# Patient Record
Sex: FEMALE | Race: BLACK OR AFRICAN AMERICAN | Employment: UNEMPLOYED | URBAN - METROPOLITAN AREA
[De-identification: names, ages, dates, MRNs, and addresses within clinical notes are randomized per-mention and may not be internally consistent; named-entity substitution may affect disease eponyms.]

---
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
## 2022-01-01 LAB — GBS, EXTERNAL RESULT: NEGATIVE

## 2022-01-17 ENCOUNTER — ANESTHESIA (OUTPATIENT)
Dept: LABOR AND DELIVERY | Age: 22
DRG: 560 | End: 2022-01-17
Payer: COMMERCIAL

## 2022-01-17 ENCOUNTER — HOSPITAL ENCOUNTER (INPATIENT)
Age: 22
LOS: 2 days | Discharge: HOME | DRG: 560 | End: 2022-01-19
Attending: OBSTETRICS & GYNECOLOGY | Admitting: OBSTETRICS & GYNECOLOGY
Payer: COMMERCIAL

## 2022-01-17 ENCOUNTER — ANESTHESIA EVENT (OUTPATIENT)
Dept: LABOR AND DELIVERY | Age: 22
DRG: 560 | End: 2022-01-17
Payer: COMMERCIAL

## 2022-01-17 PROBLEM — Z34.90 ENCOUNTER FOR INDUCTION OF LABOR: Status: ACTIVE | Noted: 2022-01-17

## 2022-01-17 LAB
ABO/RH: NORMAL
ALBUMIN SERPL-MCNC: 3 G/DL (ref 3.4–5)
ALP BLD-CCNC: 180 U/L (ref 40–129)
ALT SERPL-CCNC: 10 U/L (ref 10–40)
AMPHETAMINE SCREEN, URINE: ABNORMAL
ANTIBODY SCREEN: NORMAL
AST SERPL-CCNC: 18 U/L (ref 15–37)
BARBITURATE SCREEN URINE: ABNORMAL
BENZODIAZEPINE SCREEN, URINE: ABNORMAL
BILIRUB SERPL-MCNC: 0.3 MG/DL (ref 0–1)
BILIRUBIN DIRECT: <0.2 MG/DL (ref 0–0.3)
BILIRUBIN, INDIRECT: ABNORMAL MG/DL (ref 0–1)
BUN BLDV-MCNC: 3 MG/DL (ref 7–20)
BUPRENORPHINE URINE: ABNORMAL
CANNABINOID SCREEN URINE: POSITIVE
COCAINE METABOLITE SCREEN URINE: ABNORMAL
CREAT SERPL-MCNC: <0.5 MG/DL (ref 0.6–1.1)
CREATININE URINE: 147.3 MG/DL (ref 28–259)
GFR AFRICAN AMERICAN: >60
GFR NON-AFRICAN AMERICAN: >60
HCT VFR BLD CALC: 30 % (ref 36–48)
HEMOGLOBIN: 9.8 G/DL (ref 12–16)
Lab: ABNORMAL
MCH RBC QN AUTO: 26.5 PG (ref 26–34)
MCHC RBC AUTO-ENTMCNC: 32.7 G/DL (ref 31–36)
MCV RBC AUTO: 80.9 FL (ref 80–100)
METHADONE SCREEN, URINE: ABNORMAL
OPIATE SCREEN URINE: ABNORMAL
OXYCODONE URINE: ABNORMAL
PDW BLD-RTO: 17 % (ref 12.4–15.4)
PH UA: 7
PHENCYCLIDINE SCREEN URINE: ABNORMAL
PLATELET # BLD: 264 K/UL (ref 135–450)
PMV BLD AUTO: 8.4 FL (ref 5–10.5)
PROPOXYPHENE SCREEN: ABNORMAL
PROTEIN PROTEIN: 17 MG/DL
PROTEIN/CREAT RATIO: 0.1 MG/DL
RBC # BLD: 3.71 M/UL (ref 4–5.2)
RPR: NORMAL
SARS-COV-2, NAAT: NOT DETECTED
TOTAL PROTEIN: 6.3 G/DL (ref 6.4–8.2)
URIC ACID, SERUM: 5.9 MG/DL (ref 2.6–6)
WBC # BLD: 13.8 K/UL (ref 4–11)

## 2022-01-17 PROCEDURE — 86901 BLOOD TYPING SEROLOGIC RH(D): CPT

## 2022-01-17 PROCEDURE — 84550 ASSAY OF BLOOD/URIC ACID: CPT

## 2022-01-17 PROCEDURE — 2500000003 HC RX 250 WO HCPCS: Performed by: NURSE ANESTHETIST, CERTIFIED REGISTERED

## 2022-01-17 PROCEDURE — 82570 ASSAY OF URINE CREATININE: CPT

## 2022-01-17 PROCEDURE — 6360000002 HC RX W HCPCS: Performed by: OBSTETRICS & GYNECOLOGY

## 2022-01-17 PROCEDURE — 1220000000 HC SEMI PRIVATE OB R&B

## 2022-01-17 PROCEDURE — 84520 ASSAY OF UREA NITROGEN: CPT

## 2022-01-17 PROCEDURE — 84156 ASSAY OF PROTEIN URINE: CPT

## 2022-01-17 PROCEDURE — 51701 INSERT BLADDER CATHETER: CPT

## 2022-01-17 PROCEDURE — 82565 ASSAY OF CREATININE: CPT

## 2022-01-17 PROCEDURE — 86900 BLOOD TYPING SEROLOGIC ABO: CPT

## 2022-01-17 PROCEDURE — 2580000003 HC RX 258: Performed by: NURSE ANESTHETIST, CERTIFIED REGISTERED

## 2022-01-17 PROCEDURE — 6370000000 HC RX 637 (ALT 250 FOR IP): Performed by: OBSTETRICS & GYNECOLOGY

## 2022-01-17 PROCEDURE — 3700000025 EPIDURAL BLOCK: Performed by: ANESTHESIOLOGY

## 2022-01-17 PROCEDURE — 80076 HEPATIC FUNCTION PANEL: CPT

## 2022-01-17 PROCEDURE — 59025 FETAL NON-STRESS TEST: CPT

## 2022-01-17 PROCEDURE — 86850 RBC ANTIBODY SCREEN: CPT

## 2022-01-17 PROCEDURE — 86592 SYPHILIS TEST NON-TREP QUAL: CPT

## 2022-01-17 PROCEDURE — 2580000003 HC RX 258: Performed by: OBSTETRICS & GYNECOLOGY

## 2022-01-17 PROCEDURE — 85027 COMPLETE CBC AUTOMATED: CPT

## 2022-01-17 PROCEDURE — 96374 THER/PROPH/DIAG INJ IV PUSH: CPT

## 2022-01-17 PROCEDURE — 2500000003 HC RX 250 WO HCPCS

## 2022-01-17 PROCEDURE — 87635 SARS-COV-2 COVID-19 AMP PRB: CPT

## 2022-01-17 PROCEDURE — 80307 DRUG TEST PRSMV CHEM ANLYZR: CPT

## 2022-01-17 RX ORDER — ACETAMINOPHEN 500 MG
1000 TABLET ORAL ONCE
Status: COMPLETED | OUTPATIENT
Start: 2022-01-17 | End: 2022-01-17

## 2022-01-17 RX ORDER — NALBUPHINE HCL 10 MG/ML
5 AMPUL (ML) INJECTION EVERY 4 HOURS PRN
Status: DISCONTINUED | OUTPATIENT
Start: 2022-01-17 | End: 2022-01-17 | Stop reason: ALTCHOICE

## 2022-01-17 RX ORDER — NALBUPHINE HCL 10 MG/ML
5 AMPUL (ML) INJECTION
Status: COMPLETED | OUTPATIENT
Start: 2022-01-17 | End: 2022-01-17

## 2022-01-17 RX ORDER — CARBOPROST TROMETHAMINE 250 UG/ML
250 INJECTION, SOLUTION INTRAMUSCULAR PRN
Status: DISCONTINUED | OUTPATIENT
Start: 2022-01-17 | End: 2022-01-17

## 2022-01-17 RX ORDER — HYDROCODONE BITARTRATE AND ACETAMINOPHEN 5; 325 MG/1; MG/1
2 TABLET ORAL EVERY 4 HOURS PRN
Status: DISCONTINUED | OUTPATIENT
Start: 2022-01-17 | End: 2022-01-19 | Stop reason: HOSPADM

## 2022-01-17 RX ORDER — METHYLERGONOVINE MALEATE 0.2 MG/ML
200 INJECTION INTRAVENOUS PRN
Status: DISCONTINUED | OUTPATIENT
Start: 2022-01-17 | End: 2022-01-17

## 2022-01-17 RX ORDER — SODIUM CHLORIDE, SODIUM LACTATE, POTASSIUM CHLORIDE, CALCIUM CHLORIDE 600; 310; 30; 20 MG/100ML; MG/100ML; MG/100ML; MG/100ML
INJECTION, SOLUTION INTRAVENOUS CONTINUOUS
Status: DISCONTINUED | OUTPATIENT
Start: 2022-01-17 | End: 2022-01-17

## 2022-01-17 RX ORDER — SODIUM CHLORIDE 9 MG/ML
25 INJECTION, SOLUTION INTRAVENOUS PRN
Status: DISCONTINUED | OUTPATIENT
Start: 2022-01-17 | End: 2022-01-17

## 2022-01-17 RX ORDER — IBUPROFEN 800 MG/1
800 TABLET ORAL EVERY 8 HOURS PRN
Status: DISCONTINUED | OUTPATIENT
Start: 2022-01-17 | End: 2022-01-19 | Stop reason: HOSPADM

## 2022-01-17 RX ORDER — NALOXONE HYDROCHLORIDE 0.4 MG/ML
0.4 INJECTION, SOLUTION INTRAMUSCULAR; INTRAVENOUS; SUBCUTANEOUS PRN
Status: DISCONTINUED | OUTPATIENT
Start: 2022-01-17 | End: 2022-01-17 | Stop reason: ALTCHOICE

## 2022-01-17 RX ORDER — DOCUSATE SODIUM 100 MG/1
100 CAPSULE, LIQUID FILLED ORAL 2 TIMES DAILY
Status: DISCONTINUED | OUTPATIENT
Start: 2022-01-17 | End: 2022-01-19 | Stop reason: HOSPADM

## 2022-01-17 RX ORDER — SODIUM CHLORIDE 0.9 % (FLUSH) 0.9 %
5-40 SYRINGE (ML) INJECTION EVERY 12 HOURS SCHEDULED
Status: DISCONTINUED | OUTPATIENT
Start: 2022-01-17 | End: 2022-01-17

## 2022-01-17 RX ORDER — SEVOFLURANE 250 ML/250ML
1 LIQUID RESPIRATORY (INHALATION) CONTINUOUS PRN
Status: DISCONTINUED | OUTPATIENT
Start: 2022-01-17 | End: 2022-01-17

## 2022-01-17 RX ORDER — SODIUM CHLORIDE 9 MG/ML
25 INJECTION, SOLUTION INTRAVENOUS PRN
Status: DISCONTINUED | OUTPATIENT
Start: 2022-01-17 | End: 2022-01-19 | Stop reason: HOSPADM

## 2022-01-17 RX ORDER — SODIUM CHLORIDE, SODIUM LACTATE, POTASSIUM CHLORIDE, AND CALCIUM CHLORIDE .6; .31; .03; .02 G/100ML; G/100ML; G/100ML; G/100ML
1000 INJECTION, SOLUTION INTRAVENOUS PRN
Status: DISCONTINUED | OUTPATIENT
Start: 2022-01-17 | End: 2022-01-17

## 2022-01-17 RX ORDER — ACETAMINOPHEN 325 MG/1
650 TABLET ORAL EVERY 4 HOURS PRN
Status: DISCONTINUED | OUTPATIENT
Start: 2022-01-17 | End: 2022-01-17

## 2022-01-17 RX ORDER — ACETAMINOPHEN 325 MG/1
650 TABLET ORAL EVERY 4 HOURS PRN
Status: DISCONTINUED | OUTPATIENT
Start: 2022-01-17 | End: 2022-01-19 | Stop reason: HOSPADM

## 2022-01-17 RX ORDER — ONDANSETRON 2 MG/ML
4 INJECTION INTRAMUSCULAR; INTRAVENOUS EVERY 6 HOURS PRN
Status: DISCONTINUED | OUTPATIENT
Start: 2022-01-17 | End: 2022-01-17

## 2022-01-17 RX ORDER — SODIUM CHLORIDE, SODIUM LACTATE, POTASSIUM CHLORIDE, CALCIUM CHLORIDE 600; 310; 30; 20 MG/100ML; MG/100ML; MG/100ML; MG/100ML
INJECTION, SOLUTION INTRAVENOUS CONTINUOUS PRN
Status: DISCONTINUED | OUTPATIENT
Start: 2022-01-17 | End: 2022-01-17 | Stop reason: SDUPTHER

## 2022-01-17 RX ORDER — SODIUM CHLORIDE 0.9 % (FLUSH) 0.9 %
5-40 SYRINGE (ML) INJECTION PRN
Status: DISCONTINUED | OUTPATIENT
Start: 2022-01-17 | End: 2022-01-17

## 2022-01-17 RX ORDER — NICOTINE 21 MG/24HR
1 PATCH, TRANSDERMAL 24 HOURS TRANSDERMAL DAILY
Status: DISCONTINUED | OUTPATIENT
Start: 2022-01-18 | End: 2022-01-19 | Stop reason: HOSPADM

## 2022-01-17 RX ORDER — ONDANSETRON 2 MG/ML
4 INJECTION INTRAMUSCULAR; INTRAVENOUS EVERY 6 HOURS PRN
Status: DISCONTINUED | OUTPATIENT
Start: 2022-01-17 | End: 2022-01-17 | Stop reason: ALTCHOICE

## 2022-01-17 RX ORDER — ONDANSETRON 4 MG/1
4 TABLET, FILM COATED ORAL ONCE
Status: COMPLETED | OUTPATIENT
Start: 2022-01-17 | End: 2022-01-17

## 2022-01-17 RX ORDER — HYDROCODONE BITARTRATE AND ACETAMINOPHEN 5; 325 MG/1; MG/1
1 TABLET ORAL EVERY 4 HOURS PRN
Status: DISCONTINUED | OUTPATIENT
Start: 2022-01-17 | End: 2022-01-19 | Stop reason: HOSPADM

## 2022-01-17 RX ORDER — LANOLIN 100 %
OINTMENT (GRAM) TOPICAL PRN
Status: DISCONTINUED | OUTPATIENT
Start: 2022-01-17 | End: 2022-01-19 | Stop reason: HOSPADM

## 2022-01-17 RX ORDER — MISOPROSTOL 200 UG/1
800 TABLET ORAL PRN
Status: DISCONTINUED | OUTPATIENT
Start: 2022-01-17 | End: 2022-01-17

## 2022-01-17 RX ORDER — LIDOCAINE HYDROCHLORIDE 10 MG/ML
30 INJECTION, SOLUTION EPIDURAL; INFILTRATION; INTRACAUDAL; PERINEURAL PRN
Status: DISCONTINUED | OUTPATIENT
Start: 2022-01-17 | End: 2022-01-17

## 2022-01-17 RX ORDER — SODIUM CHLORIDE 0.9 % (FLUSH) 0.9 %
10 SYRINGE (ML) INJECTION EVERY 12 HOURS SCHEDULED
Status: DISCONTINUED | OUTPATIENT
Start: 2022-01-17 | End: 2022-01-19 | Stop reason: HOSPADM

## 2022-01-17 RX ORDER — DIPHENHYDRAMINE HYDROCHLORIDE 50 MG/ML
25 INJECTION INTRAMUSCULAR; INTRAVENOUS EVERY 6 HOURS PRN
Status: DISCONTINUED | OUTPATIENT
Start: 2022-01-17 | End: 2022-01-17 | Stop reason: ALTCHOICE

## 2022-01-17 RX ORDER — SODIUM CHLORIDE 0.9 % (FLUSH) 0.9 %
10 SYRINGE (ML) INJECTION PRN
Status: DISCONTINUED | OUTPATIENT
Start: 2022-01-17 | End: 2022-01-19 | Stop reason: HOSPADM

## 2022-01-17 RX ORDER — SODIUM CHLORIDE, SODIUM LACTATE, POTASSIUM CHLORIDE, AND CALCIUM CHLORIDE .6; .31; .03; .02 G/100ML; G/100ML; G/100ML; G/100ML
500 INJECTION, SOLUTION INTRAVENOUS PRN
Status: DISCONTINUED | OUTPATIENT
Start: 2022-01-17 | End: 2022-01-17

## 2022-01-17 RX ORDER — TERBUTALINE SULFATE 1 MG/ML
0.25 INJECTION, SOLUTION SUBCUTANEOUS ONCE
Status: DISCONTINUED | OUTPATIENT
Start: 2022-01-17 | End: 2022-01-17

## 2022-01-17 RX ADMIN — SODIUM CHLORIDE, POTASSIUM CHLORIDE, SODIUM LACTATE AND CALCIUM CHLORIDE: 600; 310; 30; 20 INJECTION, SOLUTION INTRAVENOUS at 17:42

## 2022-01-17 RX ADMIN — IBUPROFEN 800 MG: 800 TABLET, FILM COATED ORAL at 23:38

## 2022-01-17 RX ADMIN — SODIUM CHLORIDE, POTASSIUM CHLORIDE, SODIUM LACTATE AND CALCIUM CHLORIDE: 600; 310; 30; 20 INJECTION, SOLUTION INTRAVENOUS at 13:47

## 2022-01-17 RX ADMIN — Medication 14 ML: at 16:38

## 2022-01-17 RX ADMIN — ACETAMINOPHEN 1000 MG: 500 TABLET ORAL at 11:01

## 2022-01-17 RX ADMIN — SODIUM CHLORIDE, SODIUM LACTATE, POTASSIUM CHLORIDE, AND CALCIUM CHLORIDE: .6; .31; .03; .02 INJECTION, SOLUTION INTRAVENOUS at 16:38

## 2022-01-17 RX ADMIN — ONDANSETRON HYDROCHLORIDE 4 MG: 4 TABLET, FILM COATED ORAL at 11:01

## 2022-01-17 RX ADMIN — DOCUSATE SODIUM 100 MG: 100 CAPSULE ORAL at 23:38

## 2022-01-17 RX ADMIN — NALBUPHINE HYDROCHLORIDE 5 MG: 10 INJECTION, SOLUTION INTRAMUSCULAR; INTRAVENOUS; SUBCUTANEOUS at 13:58

## 2022-01-17 RX ADMIN — NALBUPHINE HYDROCHLORIDE 5 MG: 10 INJECTION, SOLUTION INTRAMUSCULAR; INTRAVENOUS; SUBCUTANEOUS at 13:59

## 2022-01-17 RX ADMIN — Medication 87.3 MILLI-UNITS/MIN: at 21:37

## 2022-01-17 ASSESSMENT — PAIN DESCRIPTION - DESCRIPTORS
DESCRIPTORS: CRAMPING

## 2022-01-17 ASSESSMENT — PAIN SCALES - GENERAL
PAINLEVEL_OUTOF10: 1
PAINLEVEL_OUTOF10: 10
PAINLEVEL_OUTOF10: 10

## 2022-01-17 NOTE — FLOWSHEET NOTE
Pt c/o nausea/vomitting from reflux (pt states). Called Dr. Mendez Andrade and rec'd new orders for Zofran and Tylenol. Medications administered.

## 2022-01-17 NOTE — FLOWSHEET NOTE
SVE:2-3/80/-2 possible rupture of membranes. RN did not place cytotec at this time. Dr. Mendez Andrade aware and will come see pt.

## 2022-01-17 NOTE — ANESTHESIA PRE PROCEDURE
Department of Anesthesiology  Preprocedure Note       Name:  Germaine Schultz   Age:  25 y.o.  :  2000                                          MRN:  [id number]         Date:  2022      Surgeon: Dr. Jonatan Betts  Procedure: Yossi Estimable for L&D  Medications prior to admission:   Prior to Admission medications    Medication Sig Start Date End Date Taking?  Authorizing Provider   ARIPiprazole (ABILIFY) 10 MG tablet Take by mouth daily   Yes Historical Provider, MD   Prenatal 6.75-0.2 MG TABS Take 1 tablet by mouth daily 21  Yes Travis Calderon DO       Current medications:    Current Facility-Administered Medications   Medication Dose Route Frequency Provider Last Rate Last Admin    miSOPROStol (CYTOTEC) pre-split tablet TABS 25 mcg  25 mcg Vaginal Q4H PRN Afshan Gonzales MD        oxytocin (PITOCIN) 30 units in 500 mL infusion  1-20 maggie-units/min IntraVENous Continuous Afshan Gonzales MD        terbutaline (BRETHINE) injection 0.25 mg  0.25 mg SubCUTAneous Once Afshan Gonzales MD        lactated ringers infusion   IntraVENous Continuous Afshan Gonzales  mL/hr at 22 1347 New Bag at 22 1347    lactated ringers bolus  500 mL IntraVENous PRN Afshan Gonzales MD        Or    lactated ringers bolus  1,000 mL IntraVENous PRN Afshan Gonzales MD        sodium chloride flush 0.9 % injection 5-40 mL  5-40 mL IntraVENous 2 times per day Afshan Gonzales MD        sodium chloride flush 0.9 % injection 5-40 mL  5-40 mL IntraVENous PRN Afshan Gonzales MD        0.9 % sodium chloride infusion  25 mL IntraVENous PRN Afshan Gonzales MD        lidocaine PF 1 % injection 30 mL  30 mL Other PRN Afshan Gonzales MD        nitrous oxide 50% inhalation 1 each  1 each Inhalation Continuous PRN Afshan Gonzales MD        [facility] injection 4 mg  4 mg IntraVENous Q6H PRN Afshan Gonzales MD        oxytocin (PITOCIN) 30 units in 500 mL infusion  87.3 maggie-units/min IntraVENous PRN Luke Mccartney MD        And    oxytocin (PITOCIN) 10 unit bolus from the bag  10 Units IntraVENous PRN Luke Mccartney MD        methylergonovine (METHERGINE) injection 200 mcg  200 mcg IntraMUSCular PRN Luke Mccartney MD        carboprost (HEMABATE) injection 250 mcg  250 mcg IntraMUSCular PRN Luke Mccartney MD        miSOPROStol (CYTOTEC) tablet 800 mcg  800 mcg Rectal PRN Luke Mccartney MD        acetaminophen (TYLENOL) tablet 650 mg  650 mg Oral Q4H PRN Luke Mccartney MD        benzocaine-menthol (DERMOPLAST) 20-0.5 % spray   Topical PRN Luke Mccartney MD           Allergies:  No Known Allergies    Problem List:    Patient Active Problem List   Diagnosis Code    Bipolar 1 disorder ([place] [address].) F31.9    Class 3 severe obesity due to excess calories with serious comorbidity and body mass index (BMI) of 45.0 to 49.9 in adult ([facility] E66.01, Z68.42    Encounter for induction of labor Z34.90       Past Medical History:        Diagnosis Date    Bipolar 1 disorder ([facility]     Depression        Past Surgical History:  History reviewed. No pertinent surgical history.     Social History:    Social History     Tobacco Use    Smoking status: Never Smoker    Smokeless tobacco: Never Used   Substance Use Topics    Alcohol use: Never                                Counseling given: Not Answered      Vital Signs (Current):   Vitals:    01/17/22 1214 01/17/22 1409 01/17/22 1431 01/17/22 1433   BP: (!) 139/111 (!) 168/103 (!) 185/105 (!) 185/105   Pulse: 96 81 78 78   Resp:       Temp:       TempSrc:       SpO2:       Weight:       Height:                                                  BP Readings from Last 3 Encounters:   01/17/22 (!) 185/105   09/23/21 (!) 131/93   09/18/21 113/65       NPO Status:                                                                                 BMI:   Wt Readings from Last 3 Encounters:   01/17/22 (!) 299 lb (135.6 kg) (>99 %, Z= 2.78)*   09/23/21 (!) 258 lb (117 kg) (>99 %, Z= 2.52)*   09/18/21 (!) 258 lb (117 kg) (>99 %, Z= 2.52)*     * Growth percentiles are based on CDC (Girls, 2-20 Years) data. Body mass index is 45.46 kg/m². CBC:   Lab Results   Component Value Date    WBC 13.8 01/17/2022    RBC 3.71 01/17/2022    HGB 9.8 01/17/2022    HCT 30.0 01/17/2022    MCV 80.9 01/17/2022    RDW 17.0 01/17/2022     01/17/2022       CMP:   Lab Results   Component Value Date     09/23/2021    K 3.0 09/23/2021     09/23/2021    CO2 18 09/23/2021    BUN 3 01/17/2022    CREATININE <0.5 01/17/2022    GFRAA >60 01/17/2022    AGRATIO 1.1 09/23/2021    LABGLOM >60 01/17/2022    GLUCOSE 107 09/23/2021    PROT 6.3 01/17/2022    CALCIUM 8.8 09/23/2021    BILITOT 0.3 01/17/2022    ALKPHOS 180 01/17/2022    AST 18 01/17/2022    ALT 10 01/17/2022       POC Tests: No results for input(s): POCGLU, POCNA, POCK, POCCL, POCBUN, POCHEMO, POCHCT in the last 72 hours. Coags: No results found for: PROTIME, INR, APTT    HCG (If Applicable): No results found for: PREGTESTUR, PREGSERUM, HCG, HCGQUANT     ABGs: No results found for: PHART, PO2ART, KYP9AQH, MWA4MZX, BEART, K4HPQBFS     Type & Screen (If Applicable):  No results found for: LABABO, LABRH    Drug/Infectious Status (If Applicable):  No results found for: HIV, HEPCAB    COVID-19 Screening (If Applicable):   Lab Results   Component Value Date    COVID19 Not Detected 01/17/2022           Anesthesia Evaluation  Patient summary reviewed and Nursing notes reviewed  Airway: Mallampati: II  TM distance: >3 FB   Neck ROM: full  Mouth opening: > = 3 FB Dental: normal exam         Pulmonary:Negative Pulmonary ROS and normal exam              Patient did not smoke on day of surgery.                  Cardiovascular:Negative CV ROS             Beta Blocker:  Not on Beta Blocker         Neuro/Psych:   Negative Neuro/Psych ROS              GI/Hepatic/Renal: Neg GI/Hepatic/Renal ROS            Endo/Other: Negative Endo/Other ROS Pt had no PAT visit       Abdominal:             Vascular: negative vascular ROS. Other Findings:             Anesthesia Plan      epidural     ASA 2             Anesthetic plan and risks discussed with spouse and patient. Use of blood products discussed with patient and spouse whom consented to blood products. Plan discussed with CRNA. OB History        1    Para        Term                AB        Living           SAB        IAB        Ectopic        Molar        Multiple        Live Births                    Ernesto Cai is a 25y.o. year-old female admitted to Alejandro Jimenes MD for INGRID. Gestational age is 38w3d. Her Body mass index is 45.46 kg/m². She was seen, examined and her chart was reviewed (including anesthesia, drug and allergy history). No interval changes are noted to her history and physical examination. (except as noted above).     Risks/benefits/alternatives of both neuraxial and general anesthesia were discussed and she agrees to proceed at the direction of the care team.    KATE Heard - KEZIA  2022  3:18 PM    KATE Heard - KEZIA   2022

## 2022-01-17 NOTE — ANESTHESIA PROCEDURE NOTES
Epidural Block    Patient location during procedure: OB  Start time: 1/17/2022 4:38 PM  End time: 1/17/2022 4:52 PM  Reason for block: labor epidural  Preanesthetic Checklist  Completed: patient identified, IV checked, site marked, risks and benefits discussed, surgical consent, monitors and equipment checked, pre-op evaluation, timeout performed, anesthesia consent given, oxygen available and patient being monitored  Epidural  Patient position: sitting  Prep: Betadine and site prepped and draped  Patient monitoring: continuous pulse ox and frequent blood pressure checks  Approach: midline  Location: lumbar (1-5)  Injection technique: FOREST saline  Guidance: paresthesia technique  Provider prep: mask and sterile gloves  Needle  Needle type: Tuohy   Needle gauge: 17 G  Needle length: 3.5 in  Needle insertion depth: 9 cm  Catheter type: side hole  Catheter size: 20G. Catheter at skin depth: 16 cm  Test dose: negative  Assessment  Sensory level: T6  Hemodynamics: stable  Attempts: 1  Additional Notes  Sterile tech. , sitting position, Lidocaine skin wheel, secured with steri-strips, tegaderm dressing, dosed & infusion with Marcaine 0.125% with Fentanyl 3 mcg/ml, infusion rate at 5 ml every 20 minutes.

## 2022-01-17 NOTE — FLOWSHEET NOTE
Pt did not eat lunch. States the food she ordered looked gross. Offered other options, but pt declined.

## 2022-01-17 NOTE — H&P
Department of Obstetrics and Gynecology   Obstetrics History and Physical        CHIEF COMPLAINT:  contractions    HISTORY OF PRESENT ILLNESS:    Michelle Evans  is a 25 y.o.  female at 38w3d presents with a chief complaint as above and is being admitted for gestational hypertension and induction. Patient presented to triage with c/o irregular contractions. Found to have mild to severe range Bps. Pre-E labs WNL but recommend IOL based on diagnosis of GHTN. Estimated Due Date: Estimated Date of Delivery: 22    PRENATAL CARE: Complicated by: gestational hypertension    PAST OB HISTORY:  OB History        1    Para        Term                AB        Living           SAB        IAB        Ectopic        Molar        Multiple        Live Births                  Past Medical History:        Diagnosis Date    Bipolar 1 disorder ([facility] [address]     Depression      Past Surgical History:    History reviewed. No pertinent surgical history. Allergies:  Patient has no known allergies.   Social History:    Social History     Socioeconomic History    Marital status: Single     Spouse name: Tequila Chacon Number of children: 1    Years of education: Not on file    Highest education level: Not on file   Occupational History    Occupation: unemployed   Tobacco Use    Smoking status: Never Smoker    Smokeless tobacco: Never Used   Vaping Use    Vaping Use: Never used   Substance and Sexual Activity    Alcohol use: Never    Drug use: Not Currently     Types: Marijuana Enio Don)     Comment: prior to pregnancy    Sexual activity: Yes     Partners: Male   Other Topics Concern    Not on file   Social History Narrative    Not on file     Social Determinants of Health     Financial Resource Strain:     Difficulty of Paying Living Expenses: Not on file   Food Insecurity:     Worried About Running Out of Food in the Last Year: Not on file    Kimberley of Food in the Last Year: Not on HJovanny Webber Needs:     Lack of Transportation (Medical): Not on file    Lack of Transportation (Non-Medical):  Not on file   Physical Activity:     Days of Exercise per Week: Not on file    Minutes of Exercise per Session: Not on file   Stress:     Feeling of Stress : Not on file   Social Connections:     Frequency of Communication with Friends and Family: Not on file    Frequency of Social Gatherings with Friends and Family: Not on file    Attends Confucianism Services: Not on file    Active Member of [address] [place] [address] or Organizations: Not on file    Attends Club or Organization Meetings: Not on file    Marital Status: Not on file   Intimate Partner Violence:     Fear of Current or Ex-Partner: Not on file    Emotionally Abused: Not on file    Physically Abused: Not on file    Sexually Abused: Not on file   Housing Stability:     Unable to Pay for Housing in the Last Year: Not on file    Number of [place] in the Last Year: Not on file    Unstable Housing in the Last Year: Not on file     Family History:       Problem Relation Age of Onset    High Blood Pressure Mother     Mental Illness Father     Seizures Maternal Grandmother     Hypertension Maternal Grandmother     High Blood Pressure Maternal Grandmother     Stroke Maternal Grandmother     High Blood Pressure Maternal Grandfather      Medications Prior to Admission:  Medications Prior to Admission: ARIPiprazole (ABILIFY) 10 MG tablet, Take by mouth daily  Prenatal 6.75-0.2 MG TABS, Take 1 tablet by mouth daily  [DISCONTINUED] ondansetron (ZOFRAN-ODT) 4 MG disintegrating tablet, Take 1 tablet by mouth 3 times daily as needed for Nausea or Vomiting  [DISCONTINUED] potassium chloride (KLOR-CON M) 10 MEQ extended release tablet, Take 1 tablet by mouth 2 times daily for 7 days  [DISCONTINUED] ondansetron (ZOFRAN ODT) 4 MG disintegrating tablet, Take 1 tablet by mouth every 8 hours as needed for Nausea or Vomiting  [DISCONTINUED] potassium chloride (KLOR-CON M) 20 MEQ extended release tablet, Take 1 tablet by mouth daily for 5 days  [DISCONTINUED] Magnesium Oxide (MAG-OXIDE) 200 MG TABS, Take 400 mg by mouth daily for 5 days    REVIEW OF SYSTEMS:  negative     PHYSICAL EXAM:    Vitals:    22 1214 22 1409 22 1431 22 1433   BP: (!) 139/111 (!) 168/103 (!) 185/105 (!) 185/105   Pulse: 96 81 78 78   Resp:       Temp:       TempSrc:       SpO2:       Weight:       Height:         General appearance:  awake, alert, cooperative, no apparent distress, and appears stated age  Neurologic:  Awake, alert, oriented to name, place and time.     Lungs:  No increased work of breathing, good air exchange  Abdomen:  Soft, non tender, gravid, fundal height consistent with the gestational age, EFW by Leopold's maneuvers is AGA  Pelvis:  Adequate pelvis  Cervix: 1/60/-3  Contraction frequency: irreg ctxs  Membranes:  Intact  Labs:   CBC:   Lab Results   Component Value Date    WBC 13.8 2022    RBC 3.71 2022    HGB 9.8 2022    HCT 30.0 2022    MCV 80.9 2022    MCH 26.5 2022    MCHC 32.7 2022    RDW 17.0 2022     2022    MPV 8.4 2022     BUN/Creatinine:    Lab Results   Component Value Date    BUN 3 2022    CREATININE <0.5 2022     Hepatic Function Panel:    Lab Results   Component Value Date    ALKPHOS 180 2022    ALT 10 2022    AST 18 2022    PROT 6.3 2022    BILITOT 0.3 2022    BILIDIR <0.2 2022    IBILI see below 2022    LABALBU 3.0 2022     Uric Acid:    Lab Results   Component Value Date    LABURIC 5.9 2022     Protein/Cr: 0.1    ASSESSMENT:  25 y.o.  @ 38w4d with GHTN   Pre-eclampsia labs WNL   Bps 140-160s/80-100s, asymptomatic    PLAN: plan cytotec for cervical ripening and labor induction, Admit  Labor: Routine labor orders  Fetus: Reassuring  GBS: Negative    I have presented reasonable alternatives to the patient's proposed care,

## 2022-01-17 NOTE — FLOWSHEET NOTE
Dr. Med Marrero at bedside talking with pt. Wants to admit pt for IOL for hypertension. Pt agreeable. Pt hungry and wanting to shower. Dr. Med Marrero okay for pt to shower and order lunch prior to placing IV and prior to getting placed back on monitors in labor room and starting induction. Dr. Med Marrero states RN does not need to place pt in seizure precautions with elevated pressures.

## 2022-01-17 NOTE — PLAN OF CARE
Problem: Pain:  Goal: Pain level will decrease  Description: Pain level will decrease  Outcome: Ongoing   Nubain given for pain  Problem: Fluid Volume - Imbalance:  Goal: Absence of intrapartum hemorrhage signs and symptoms  Description: Absence of intrapartum hemorrhage signs and symptoms  Outcome: Ongoing   No s/s  Problem: Infection - Intrapartum Infection:  Goal: Will show no infection signs and symptoms  Description: Will show no infection signs and symptoms  Outcome: Ongoing   No s/s

## 2022-01-17 NOTE — FLOWSHEET NOTE
Pt arrived to hospital via squad. Transferred to Triage A. Changed into gown. Urine specimen obtained. Placed on EFM. Discussed plan of care. All questions answered. Verbalized understanding.

## 2022-01-17 NOTE — FLOWSHEET NOTE
NST       01/17/22 1000   Maternal Vitals (MEW-T)   Heart Rate (!) 112   Heart Rate Source Monitor   Resp 18   BP (!) 146/98   BP Method Automatic   Patient Position Turns self;Semi fowlers   Level of Consciousness Alert   Intervention Continuing to Monitor   Labor & Delivery Summary   Is patient in active labor? No   Fetal Heart Rate   Mode External US   Baseline Rate 130 bpm   Baseline Classification Normal   Variability 6-25 BPM   Pattern Accelerations   Patient Feels Fetal Movement Yes   Interventions RN at Bedside; [place] Adjusted;Ultrasound Adjusted   OB Bladder Status Voiding   OB Bladder Intervention Voiding with Relief   Multiple birth? No   Fetal Monitoring Strip   FMS Reviewed? Yes   FMS Reviewed By? Lexy Redman RN   Uterine Activity   UA Mode Palpation; [place]   Contraction Frequency irreg with irritability   Resting Tone Palpated Soft   Vaginal Drainage   Mucous No   Vaginal Bleeding   Vaginal Bleeding?  No   Preeclampsia Evaluation   Preeclampsia Evaluation Within Defined Limits   Headache No   Visual change No   Epigastric pain No   Nausea/Vomiting No   DTR LLE 2   DTR LLE Clonus Absent   DTR RLE 2   DTR RLE Clonus Absent   Comfort and Hygiene   Level of Assistance Independent   Oxygen Therapy   O2 Device None (Room air)

## 2022-01-17 NOTE — FLOWSHEET NOTE
Call to Dr. Gucci Arauz to advise of patient's arrival to triage. SVE 1/60/-3 per MELODY Norris RN. MD will come in to evaluate at beside.

## 2022-01-18 PROCEDURE — 6370000000 HC RX 637 (ALT 250 FOR IP): Performed by: OBSTETRICS & GYNECOLOGY

## 2022-01-18 PROCEDURE — 0UQMXZZ REPAIR VULVA, EXTERNAL APPROACH: ICD-10-PCS | Performed by: OBSTETRICS & GYNECOLOGY

## 2022-01-18 PROCEDURE — 1220000000 HC SEMI PRIVATE OB R&B

## 2022-01-18 PROCEDURE — 7200000001 HC VAGINAL DELIVERY

## 2022-01-18 PROCEDURE — 3E033VJ INTRODUCTION OF OTHER HORMONE INTO PERIPHERAL VEIN, PERCUTANEOUS APPROACH: ICD-10-PCS | Performed by: OBSTETRICS & GYNECOLOGY

## 2022-01-18 RX ADMIN — DOCUSATE SODIUM 100 MG: 100 CAPSULE ORAL at 09:15

## 2022-01-18 RX ADMIN — DOCUSATE SODIUM 100 MG: 100 CAPSULE ORAL at 21:28

## 2022-01-18 RX ADMIN — IBUPROFEN 800 MG: 800 TABLET, FILM COATED ORAL at 09:15

## 2022-01-18 ASSESSMENT — PAIN SCALES - GENERAL
PAINLEVEL_OUTOF10: 0

## 2022-01-18 ASSESSMENT — PAIN DESCRIPTION - PROGRESSION: CLINICAL_PROGRESSION: NOT CHANGED

## 2022-01-18 ASSESSMENT — PAIN DESCRIPTION - PAIN TYPE: TYPE: ACUTE PAIN

## 2022-01-18 ASSESSMENT — PAIN DESCRIPTION - DESCRIPTORS: DESCRIPTORS: CRAMPING

## 2022-01-18 ASSESSMENT — PAIN DESCRIPTION - ORIENTATION: ORIENTATION: LOWER;MID

## 2022-01-18 ASSESSMENT — PAIN DESCRIPTION - FREQUENCY: FREQUENCY: INTERMITTENT

## 2022-01-18 ASSESSMENT — PAIN DESCRIPTION - LOCATION: LOCATION: ABDOMEN

## 2022-01-18 ASSESSMENT — PAIN - FUNCTIONAL ASSESSMENT: PAIN_FUNCTIONAL_ASSESSMENT: ACTIVITIES ARE NOT PREVENTED

## 2022-01-18 NOTE — DISCHARGE SUMMARY
Department of Obstetrics and Gynecology  Postpartum Discharge Summary      Admit Date: 2022    Admit Diagnosis: Encounter for induction of labor [Z34.90] @ 38 4/, [facility]    Discharge Date: 2022  Any delay in discharge from ordered D/C date due to  factors. Discharge Diagnoses: spontaneous vaginal delivery       Medication List      CONTINUE taking these medications    ARIPiprazole 10 MG tablet  Commonly known as: ABILIFY     Prenatal 6.75-0.2 MG Tabs  Take 1 tablet by mouth daily            Service: Obstetrics    Consults: none    Significant Diagnostic Studies: none    Postpartum complications: none     Condition at Discharge: good    Hospital Course: uncomplicated    Discharge Instructions: Activity: as tolerated    Diet: regular diet    Instructions: No intercourse and nothing in the vagina for 6 weeks. Do not drive while using pain medications.  Keep any wounds clean and dry    Discharge to: Home    Disposition / Follow up: Return to office in 1 week    Home Health Nurse visit within 24-48 h if qualifies    [place] Data:  Apgars:  Information for the patient's :  Jennifer Arango [[id number]]   APGAR One: 9     Information for the patient's :  Jennifer Arango [[id number]]   APGAR Five: 9     Birth Weight:  Information for the patient's :  Jennifer Arango [[id number]]   Birth Weight: 6 lb 4.9 oz (2.86 kg)     Home with mother    Electronically signed by Patricio Muniz MD on 2022 at 8:38 AM

## 2022-01-18 NOTE — FLOWSHEET NOTE
Dr. Renuka Luna notified for [facility] during pushing. Pt pushing on left side with RNx3 at bedside. Dr. Chris Jorge to evaluate.

## 2022-01-18 NOTE — PROGRESS NOTES
Department of Obstetrics and Gynecology  Vaginal Delivery Postpartum Rounds    SUBJECTIVE:  Pain is controlled with non-steroidal anti-inflammatory drugs. Her lochia is normal.    OBJECTIVE:  Vital Signs: /73   Pulse 92   Temp 98.1 °F (36.7 °C) (Oral)   Resp 18   Ht 5' 8\" (1.727 m)   Wt (!) 299 lb (135.6 kg)   LMP 04/22/2021   SpO2 98%   Breastfeeding Unknown   BMI 45.46 kg/m²   Appearance/Psychiatric: awake, alert, cooperative, no apparent distress, appears stated age  Constitutional: The patient is well nourished. Cardiovascular: She does not have edema.   Respiratory: Respiratory effort is normal.  Gastrointestinal: Soft, non tender, uterine fundus is firm below umbilicus  Extremities: nontender to palpation  Perineum: intact       Lab Results   Component Value Date    WBC 13.8 01/17/2022    RBC 3.71 01/17/2022    HGB 9.8 01/17/2022    HCT 30.0 01/17/2022    MCV 80.9 01/17/2022    MCH 26.5 01/17/2022    MCHC 32.7 01/17/2022    RDW 17.0 01/17/2022     01/17/2022    MPV 8.4 01/17/2022     Lab Results   Component Value Date     09/23/2021    K 3.0 09/23/2021     09/23/2021    CO2 18 09/23/2021    BUN 3 01/17/2022    CREATININE <0.5 01/17/2022    GLUCOSE 107 09/23/2021    CALCIUM 8.8 09/23/2021     No results found for: POCGLU  Lab Results   Component Value Date    ALKPHOS 180 01/17/2022    ALT 10 01/17/2022    AST 18 01/17/2022    PROT 6.3 01/17/2022    BILITOT 0.3 01/17/2022    BILIDIR <0.2 01/17/2022    LABALBU 3.0 01/17/2022     Lab Results   Component Value Date    COLORU DARK YELLOW 09/18/2021    CLARITYU SL CLOUDY 09/18/2021    SPECGRAV 1.020 09/18/2021    MUCUS 2+ 09/18/2021    AMORPHOUS 2+ 09/18/2021    PHUR 7.0 01/17/2022    WBCUA 3-5 09/18/2021    NITRU Negative 09/18/2021    LEUKOCYTESUR Negative 09/18/2021    BACTERIA 3+ 09/18/2021    RBCUA 0-2 09/18/2021    BLOODU Negative 09/18/2021    UROBILINOGEN 2.0 09/18/2021    BILIRUBINUR Negative 09/18/2021    GLUCOSEU Negative

## 2022-01-18 NOTE — FLOWSHEET NOTE
Patient up and walking to the bathroom. With contact guard assist x 2. Steady gate noted. Able to void easily 300ml. Educated on proper bernie care and use of bernie meds. Patient able to demonstrate understanding. Epidural catheter removed with tip intact. IV saline locked pt ambulated to wheelchair. Belongings packed and transferred with patient to room . Patient oriented to room, call light, plan of care, binder, white board updated and bathroom essentials. All questions answered, ice water provided. Family members x 1 at bedside. Call light within reach and no further needs at this time.

## 2022-01-18 NOTE — FLOWSHEET NOTE
Pt delivered viable female infant at 2132, . With RN and Dr. Kirill Bassett  Remaining at bedside with continuous monitoring during pushing. Infant dried and stimulated. Suctioned with a bulb syringe. Delayed cord clamping. Infant placed skin to skin with mother.

## 2022-01-18 NOTE — ANESTHESIA POSTPROCEDURE EVALUATION
Department of Anesthesiology  Postprocedure Note    Patient: Dorian Street  MRN: [id number]  YOB: 2000  Date of evaluation: 1/17/2022  Time:  10:22 PM     Procedure Summary     Date: 01/17/22 Room / Location:     Anesthesia Start: 3827 Anesthesia Stop: 2132    Procedure: Labor Analgesia Diagnosis:     Scheduled Providers:  Responsible Provider: Omid Gay MD    Anesthesia Type: epidural ASA Status: 2          Anesthesia Type: epidural    Art Phase I: Art Score: 9    Art Phase II:      Last vitals: Reviewed and per EMR flowsheets.        Anesthesia Post Evaluation    Patient location during evaluation: floor  Patient participation: complete - patient participated  Level of consciousness: awake and alert  Pain score: 2  Airway patency: patent  Nausea & Vomiting: no vomiting and no nausea  Complications: no  Cardiovascular status: hemodynamically stable  Respiratory status: room air, spontaneous ventilation and acceptable  Hydration status: stable

## 2022-01-18 NOTE — FLOWSHEET NOTE
Dr. Gucci Arauz to bedside, SVE AC/100/0. Pt found to be ruptured at this time. Pt states possible rupture this morning, she thought it was urine. Patient not feeling pressure at this time, Dr. Gucci Aaruz instructed patient to call when feeling pressure. Room to be set up for vaginal delivery.

## 2022-01-18 NOTE — L&D DELIVERY NOTE
Department of Obstetrics and Gynecology  Spontaneous Vaginal Delivery Note      Nicki Holder [id number]    PRENATAL CARE: Complicated by: gestational hypertension    Pre-operative Diagnosis:  Term pregnancy, Spontaneous labor and Single fetus      Post-operative Diagnosis: The same    Additional Procedures: labial laceration repair     Information for the patient's :  Armand De La O [[id number]]                    Infant Wt:   Information for the patient's :  Armand Costags [[id number]]            APGARS:     Information for the patient's :  Armand De La O [[id number]]           Anesthesia:  epidural anesthesia    Specimen:  Placenta sent to pathology No    Estimated blood loss: 300 cc     Condition: mother and infant stable in LDR    Infant Feeding: bottle    Delivery Summary: Patient is Roderick Awad is a 25 y.o.  female at 38w4d admitted to Labor and Delivery room and placed on external monitors. Contractions were regular, FHT was reactive with moderate variability without decels. Patient did received epidural anesthesia. Labor progressed as anticipated to fully dilated cervix. With subsequent contractions she started pushing and delivered vaginally spontaneously with no complications. 10 Units of Pitocin in 500 ml of LR was started IV. Placenta, cord and membranes were delivered spontaneously within less than 15 minutes. Uterus was not explored. Vaginal walls, cervix, perineum, rectum were not intact on inspection. The cervix, vagina and perineum were examined and R labial laceration was repaired in the regular fashion with Vicryl. A L periurethral laceration was hemostatic not requiring repair. Uterus was well contracted. Vaginal sweep was done, laps count was correct. Mother and  left stable to recovery.      Electronically signed by Estrella Parra MD on 2022 at 10:00 PM

## 2022-01-18 NOTE — FLOWSHEET NOTE
Pt up ambulating in room without difficulty. Gait steady. Morning assessment completed. Pt denies any needs at this time.

## 2022-01-19 VITALS
RESPIRATION RATE: 16 BRPM | HEIGHT: 68 IN | WEIGHT: 293 LBS | BODY MASS INDEX: 44.41 KG/M2 | HEART RATE: 86 BPM | OXYGEN SATURATION: 100 % | DIASTOLIC BLOOD PRESSURE: 82 MMHG | SYSTOLIC BLOOD PRESSURE: 130 MMHG | TEMPERATURE: 98.6 F

## 2022-01-19 PROCEDURE — 6370000000 HC RX 637 (ALT 250 FOR IP): Performed by: OBSTETRICS & GYNECOLOGY

## 2022-01-19 RX ADMIN — DOCUSATE SODIUM 100 MG: 100 CAPSULE ORAL at 08:08

## 2022-01-19 NOTE — FLOWSHEET NOTE

## 2022-01-19 NOTE — FLOWSHEET NOTE
Up and about in room. Tolerating a regular diet, drinking fluids. Offers no complaints. Is bottle feeding infant.

## 2022-01-19 NOTE — PLAN OF CARE
Problem: Pain:  Goal: Pain level will decrease  Description: Pain level will decrease  1/19/2022 1209 by Alexander Mckeon RN  Outcome: Completed  1/19/2022 0435 by Humera Vee RN  Outcome: Met This Shift  Goal: Control of acute pain  Description: Control of acute pain  Outcome: Completed  Goal: Control of chronic pain  Description: Control of chronic pain  Outcome: Completed     Problem: Anxiety:  Goal: Level of anxiety will decrease  Description: Level of anxiety will decrease  Outcome: Completed     Problem: Breathing Pattern - Ineffective:  Goal: Able to breathe comfortably  Description: Able to breathe comfortably  Outcome: Completed     Problem: Fluid Volume - Imbalance:  Goal: Absence of imbalanced fluid volume signs and symptoms  Description: Absence of imbalanced fluid volume signs and symptoms  1/19/2022 1209 by Alexander Mckeon RN  Outcome: Completed  1/19/2022 0435 by Humera Vee RN  Outcome: Ongoing  Goal: Absence of intrapartum hemorrhage signs and symptoms  Description: Absence of intrapartum hemorrhage signs and symptoms  1/19/2022 1209 by Alexander Mckeon RN  Outcome: Completed  1/19/2022 0435 by Humera Vee RN  Outcome: Ongoing  Goal: Absence of postpartum hemorrhage signs and symptoms  Description: Absence of postpartum hemorrhage signs and symptoms  Outcome: Completed     Problem: Infection - Intrapartum Infection:  Goal: Will show no infection signs and symptoms  Description: Will show no infection signs and symptoms  Outcome: Completed     Problem: Labor Process - Prolonged:  Goal: Labor progression, first stage, within specified pattern  Description: Labor progression, first stage, within specified pattern  Outcome: Completed  Goal: Labor progession, second stage, within specified pattern  Description: Labor progession, second stage, within specified pattern  Outcome: Completed  Goal: Uterine contractions within specified parameters  Description: Uterine contractions within specified

## 2022-01-19 NOTE — CARE COORDINATION
Case Management Mom/Baby Assessment    Identifying Information    Mother of Baby: Xochilt Lind Mother's : 2002    Father of Baby: Cristela Garcia Father's : 2000    Baby's Name: Janene Lange  Delivery Date:2022  Babys Weight 6lbs 5oz  Apgar:9 and9  Nursing concerns for baby: None  Prenatal [address]  Babys Urine or Cord Drug Screen Yes_X__  No___ Results: Pending  PCP for baby: Buzz Merida Day  Date of appt: 22   Time of Appt:12pm    Reasoning for Referral: MOB test +for THC at Birth- MOB reports that she used [facility] during her pregnancy for Margarines and nausea. Assessment Information    Discharge [address] AYJPL:[phone]    Resides with: MOB's Mother, her 2 sisters and Her Boyfriend- FOB. Emergency Contact: Abdulkadir Englandmelchor Acosta MOB's mom BNPBJ:[phone]    Support System: Mother, Sisters and FOB    Other Children- None      Custody:MOB has custody of infant    Father of Baby Involvement:resides with MOB and is financially supportive. Have you ever had contact with Children's Services (describe):MOB is in the custody of [facility]  Her  is Flaquita Giron [phone]  CAREN called  Flaquita Giron and discussed MOB situation and Custody status. Dennis Rivera advised that there is a Custody hearing in 2 day to emancipate MOB. He is aware she is in her mothers home,  He will follow up with MOB. CAREN called [facility] and made new Referral for Baby due to MOB testing+ for THC. SW following for cord results.       Supplies: has all listed supplies  Car Seat  Diapers  Crib/Bassinet  Feeding  [place]        Resources:has [address] and Gisela- CAREN making referral to [facility]  [address]  Medicaid  [employer]  [employer]/[facility]  Transportation   Cash Assistance     Education:Graduated from [employer]  Occupation: works for [employer]- on Maternity leave    History   Domestic Abuse:Denied  Physical Abuse: Denied  Sexual Abuse:Denied  Drug Abuse/Prescription Medication:Denied  Depression:Denied  Medication/Counseling: Stated she was in counseling long time ago     Summary: CELSO is currently in the custody of the state and has court hearing in 2 days to be released from the custody of the [facility]. CELSO is currently living with her mother and her 2 sisters,( FOB residing with them also) , MOB  aware of birth of baby and will follow up with MOB. Referrals:Referrals made to [facility], [facility], spoke with CELSO's active  with [facility]. Intervention: MOB and infant discharged and Children Services to follow up.

## 2022-01-19 NOTE — PLAN OF CARE
Problem: Pain:  Description: Pain management should include both nonpharmacologic and pharmacologic interventions.   Goal: Pain level will decrease  Description: Pain level will decrease  Outcome: Met This Shift     Problem: Pain - Acute:  Goal: Pain level will decrease  Description: Pain level will decrease  Outcome: Met This Shift  Goal: Able to cope with pain  Description: Able to cope with pain  Outcome: Met This Shift     Problem: Fluid Volume - Imbalance:  Goal: Absence of imbalanced fluid volume signs and symptoms  Description: Absence of imbalanced fluid volume signs and symptoms  Outcome: Ongoing  Goal: Absence of intrapartum hemorrhage signs and symptoms  Description: Absence of intrapartum hemorrhage signs and symptoms  Outcome: Ongoing     Problem: Discharge Planning:  Goal: Discharged to appropriate level of care  Description: Discharged to appropriate level of care  Outcome: Ongoing     Problem: Constipation:  Goal: Bowel elimination is within specified parameters  Description: Bowel elimination is within specified parameters  Outcome: Ongoing     Problem: Mood - Altered:  Goal: Mood stable  Description: Mood stable  Outcome: Ongoing

## 2022-01-19 NOTE — FLOWSHEET NOTE
Assessment completed. Bleeding is mild, voiding without difficulty. Resting between feeding infant. Offers no complaints of pain.

## 2022-06-28 ENCOUNTER — HOSPITAL ENCOUNTER (EMERGENCY)
Age: 22
Discharge: HOME | End: 2022-06-28
Payer: MEDICAID

## 2022-06-28 VITALS
OXYGEN SATURATION: 99 % | BODY MASS INDEX: 35.89 KG/M2 | WEIGHT: 250.66 LBS | RESPIRATION RATE: 16 BRPM | DIASTOLIC BLOOD PRESSURE: 82 MMHG | HEIGHT: 70 IN | SYSTOLIC BLOOD PRESSURE: 123 MMHG | TEMPERATURE: 98.4 F | HEART RATE: 84 BPM

## 2022-06-28 DIAGNOSIS — N93.9 ABNORMAL VAGINAL BLEEDING: Primary | ICD-10-CM

## 2022-06-28 LAB
BACTERIA: ABNORMAL /HPF
BILIRUBIN URINE: ABNORMAL
BLOOD, URINE: ABNORMAL
CLARITY: ABNORMAL
COLOR: ABNORMAL
EPITHELIAL CELLS, UA: 32 /HPF (ref 0–5)
GLUCOSE URINE: NEGATIVE MG/DL
HCG(URINE) PREGNANCY TEST: NEGATIVE
HYALINE CASTS: 2 /LPF (ref 0–8)
KETONES, URINE: 40 MG/DL
LEUKOCYTE ESTERASE, URINE: ABNORMAL
MICROSCOPIC EXAMINATION: YES
MUCUS: PRESENT
NITRITE, URINE: NEGATIVE
PH UA: 6 (ref 5–8)
PROTEIN UA: 30 MG/DL
RBC UA: 1 /HPF (ref 0–4)
SPECIFIC GRAVITY UA: 1.03 (ref 1–1.03)
URINE REFLEX TO CULTURE: ABNORMAL
URINE TYPE: ABNORMAL
UROBILINOGEN, URINE: 2 E.U./DL
WBC UA: 3 /HPF (ref 0–5)

## 2022-06-28 PROCEDURE — 84703 CHORIONIC GONADOTROPIN ASSAY: CPT

## 2022-06-28 PROCEDURE — 81001 URINALYSIS AUTO W/SCOPE: CPT

## 2022-06-28 PROCEDURE — 99283 EMERGENCY DEPT VISIT LOW MDM: CPT

## 2022-06-28 ASSESSMENT — PAIN SCALES - GENERAL: PAINLEVEL_OUTOF10: 0

## 2022-06-28 ASSESSMENT — ENCOUNTER SYMPTOMS
SORE THROAT: 0
ABDOMINAL PAIN: 1
NAUSEA: 0
BACK PAIN: 0
SHORTNESS OF BREATH: 0
VOMITING: 0

## 2022-06-28 ASSESSMENT — PAIN - FUNCTIONAL ASSESSMENT: PAIN_FUNCTIONAL_ASSESSMENT: NONE - DENIES PAIN

## 2022-06-28 NOTE — ED NOTES
Pt went to [facility]ro[facility] for sample and forgot to go in cup. Pt sent to lobby with cup for when she is able to attempt again to provide the sample.      Abhi Bowser, FAMILIA  06/28/22 0001

## 2022-06-28 NOTE — ED PROVIDER NOTES
noted above the remainder of the review of systems was reviewed and negative. PAST MEDICALHISTORY         Diagnosis Date    Bipolar 1 disorder ([facility] [address]     Depression        SURGICAL HISTORY     History reviewed. No pertinent surgical history. CURRENT MEDICATIONS       Previous Medications    ARIPIPRAZOLE (ABILIFY) 10 MG TABLET    Take by mouth daily    PRENATAL 6.75-0.2 MG TABS    Take 1 tablet by mouth daily       ALLERGIES     Patient has no known allergies. FAMILY HISTORY           Problem Relation Age of Onset    High Blood Pressure Mother     Mental Illness Father     Seizures Maternal Grandmother     Hypertension Maternal Grandmother     High Blood Pressure Maternal Grandmother     Stroke Maternal Grandmother     High Blood Pressure Maternal Grandfather      Family Status   Relation Name Status    Mother  Alive    Father  Alive    Sister  Alive    Sister  Alive    Sister  Alive    Brother  Alive    MGM      MGF  Alive    PGM  Alive    PGF  Alive        SOCIAL HISTORY    reports that she has never smoked. She has never used smokeless tobacco. She reports previous drug use. Drug: Marijuana Shethbertha Nuñez). She reports that she does not drink alcohol. PHYSICAL EXAM    (up to 7 for level 4, 8 or more for level 5)     ED Triage Vitals [22 1243]   BP Temp Temp Source Heart Rate Resp SpO2 Height Weight   124/78 98.4 °F (36.9 °C) Oral 92 18 98 % 5' 10\" (1.778 m) 250 lb 10.6 oz (113.7 kg)       Physical Exam  Vitals and nursing note reviewed. Constitutional:       General: She is not in acute distress. Appearance: She is well-developed. HENT:      Head: Normocephalic and atraumatic. Pulmonary:      Effort: Pulmonary effort is normal. No respiratory distress. Abdominal:      Palpations: Abdomen is soft. Tenderness: There is no abdominal tenderness. Musculoskeletal:         General: Normal range of motion. Cervical back: Neck supple.    Skin:     General: Skin is warm and dry. Neurological:      Mental Status: She is alert and oriented to person, place, and time. Psychiatric:         Behavior: Behavior normal.            DIAGNOSTIC RESULTS     When ordered, EKGs are interpreted by the Emergency Department Physician in the absence of a cardiologist. Please see their note for interpretation of EKG    RADIOLOGY:         Interpretation per the Radiologist below, if available at the time of this note:    No orders to display       LABS:  [address] - Abnormal; Notable for the following components:       Result Value    Color, UA DARK YELLOW (*)     Clarity, UA CLOUDY (*)     Bilirubin Urine MODERATE (*)     Ketones, Urine 40 (*)     Blood, Urine TRACE (*)     Protein, UA 30 (*)     Urobilinogen, Urine 2.0 (*)     Leukocyte Esterase, Urine SMALL (*)     All other components within normal limits   MICROSCOPIC URINALYSIS - Abnormal; Notable for the following components:    Epithelial Cells, UA 32 (*)     Mucus, UA Present (*)     All other components within normal limits   PREGNANCY, URINE       When ordered, only abnormal lab results are displayed. All other labs are within normal range or not returned as of the dictation. EMERGENCY DEPARTMENT COURSE and DIFFERENTIAL DIAGNOSIS/MDM:   Vitals:    Vitals:    06/28/22 1243   BP: 124/78   Pulse: 92   Resp: 18   Temp: 98.4 °F (36.9 °C)   TempSrc: Oral   SpO2: 98%   Weight: 250 lb 10.6 oz (113.7 kg)   Height: 5' 10\" (1.778 m)        I have evaluated this patient. My supervising physician was available for consultation. The patient is nontoxic and afebrile. She is hemodynamically stable. No concern for hemorrhagic bleeding. No tenderness on exam. Urine pregnancy test is negative. Urine has trace blood and small leukocytes however microscopic analysis shows significant contamination with epithelial cells and I have no clinical concern for infection. Patient was reassured.   She was

## 2022-06-29 ENCOUNTER — HOSPITAL ENCOUNTER (EMERGENCY)
Age: 22
Discharge: HOME | End: 2022-06-29
Payer: MEDICAID

## 2022-06-29 ENCOUNTER — APPOINTMENT (OUTPATIENT)
Dept: CT IMAGING | Age: 22
End: 2022-06-29
Payer: MEDICAID

## 2022-06-29 VITALS
TEMPERATURE: 99.5 F | RESPIRATION RATE: 16 BRPM | HEART RATE: 80 BPM | WEIGHT: 250.44 LBS | OXYGEN SATURATION: 98 % | SYSTOLIC BLOOD PRESSURE: 115 MMHG | HEIGHT: 69 IN | BODY MASS INDEX: 37.09 KG/M2 | DIASTOLIC BLOOD PRESSURE: 78 MMHG

## 2022-06-29 DIAGNOSIS — R10.9 RIGHT FLANK PAIN: Primary | ICD-10-CM

## 2022-06-29 DIAGNOSIS — E87.6 HYPOKALEMIA: ICD-10-CM

## 2022-06-29 DIAGNOSIS — N39.0 URINARY TRACT INFECTION WITHOUT HEMATURIA, SITE UNSPECIFIED: ICD-10-CM

## 2022-06-29 LAB
A/G RATIO: 1 (ref 1.1–2.2)
ALBUMIN SERPL-MCNC: 3.9 G/DL (ref 3.4–5)
ALP BLD-CCNC: 93 U/L (ref 40–129)
ALT SERPL-CCNC: 9 U/L (ref 10–40)
ANION GAP SERPL CALCULATED.3IONS-SCNC: 15 MMOL/L (ref 3–16)
ANISOCYTOSIS: ABNORMAL
AST SERPL-CCNC: 15 U/L (ref 15–37)
BACTERIA: ABNORMAL /HPF
BASOPHILS ABSOLUTE: 0 K/UL (ref 0–0.2)
BASOPHILS RELATIVE PERCENT: 0.2 %
BILIRUB SERPL-MCNC: 0.5 MG/DL (ref 0–1)
BILIRUBIN URINE: ABNORMAL
BLOOD, URINE: NEGATIVE
BUN BLDV-MCNC: 8 MG/DL (ref 7–20)
CALCIUM SERPL-MCNC: 9 MG/DL (ref 8.3–10.6)
CHLORIDE BLD-SCNC: 103 MMOL/L (ref 99–110)
CLARITY: ABNORMAL
CO2: 22 MMOL/L (ref 21–32)
COLOR: ABNORMAL
CREAT SERPL-MCNC: 0.7 MG/DL (ref 0.6–1.1)
EOSINOPHILS ABSOLUTE: 0 K/UL (ref 0–0.6)
EOSINOPHILS RELATIVE PERCENT: 0.3 %
EPITHELIAL CELLS, UA: 16 /HPF (ref 0–5)
GFR AFRICAN AMERICAN: >60
GFR NON-AFRICAN AMERICAN: >60
GLUCOSE BLD-MCNC: 97 MG/DL (ref 70–99)
GLUCOSE URINE: NEGATIVE MG/DL
HCG QUALITATIVE: NEGATIVE
HCT VFR BLD CALC: 31 % (ref 36–48)
HEMATOLOGY PATH CONSULT: YES
HEMOGLOBIN: 10.1 G/DL (ref 12–16)
HYALINE CASTS: 1 /LPF (ref 0–8)
KETONES, URINE: 80 MG/DL
LEUKOCYTE ESTERASE, URINE: ABNORMAL
LIPASE: 13 U/L (ref 13–60)
LYMPHOCYTES ABSOLUTE: 2.1 K/UL (ref 1–5.1)
LYMPHOCYTES RELATIVE PERCENT: 19 %
MCH RBC QN AUTO: 22.2 PG (ref 26–34)
MCHC RBC AUTO-ENTMCNC: 32.6 G/DL (ref 31–36)
MCV RBC AUTO: 68.1 FL (ref 80–100)
MICROCYTES: ABNORMAL
MICROSCOPIC EXAMINATION: YES
MONOCYTES ABSOLUTE: 0.9 K/UL (ref 0–1.3)
MONOCYTES RELATIVE PERCENT: 8.1 %
NEUTROPHILS ABSOLUTE: 7.9 K/UL (ref 1.7–7.7)
NEUTROPHILS RELATIVE PERCENT: 72.4 %
NITRITE, URINE: NEGATIVE
PDW BLD-RTO: 20.2 % (ref 12.4–15.4)
PH UA: 6 (ref 5–8)
PLATELET # BLD: 306 K/UL (ref 135–450)
PLATELET SLIDE REVIEW: ADEQUATE
PMV BLD AUTO: 9 FL (ref 5–10.5)
POLYCHROMASIA: ABNORMAL
POTASSIUM SERPL-SCNC: 3.2 MMOL/L (ref 3.5–5.1)
PROTEIN UA: 30 MG/DL
RBC # BLD: 4.55 M/UL (ref 4–5.2)
RBC UA: 1 /HPF (ref 0–4)
SLIDE REVIEW: ABNORMAL
SODIUM BLD-SCNC: 140 MMOL/L (ref 136–145)
SPECIFIC GRAVITY UA: 1.03 (ref 1–1.03)
TOTAL PROTEIN: 7.7 G/DL (ref 6.4–8.2)
URINE REFLEX TO CULTURE: YES
URINE TYPE: ABNORMAL
UROBILINOGEN, URINE: 2 E.U./DL
WBC # BLD: 10.9 K/UL (ref 4–11)
WBC UA: 16 /HPF (ref 0–5)

## 2022-06-29 PROCEDURE — 6370000000 HC RX 637 (ALT 250 FOR IP): Performed by: PHYSICIAN ASSISTANT

## 2022-06-29 PROCEDURE — 80053 COMPREHEN METABOLIC PANEL: CPT

## 2022-06-29 PROCEDURE — 36415 COLL VENOUS BLD VENIPUNCTURE: CPT

## 2022-06-29 PROCEDURE — 99285 EMERGENCY DEPT VISIT HI MDM: CPT

## 2022-06-29 PROCEDURE — 85025 COMPLETE CBC W/AUTO DIFF WBC: CPT

## 2022-06-29 PROCEDURE — 74177 CT ABD & PELVIS W/CONTRAST: CPT

## 2022-06-29 PROCEDURE — 87086 URINE CULTURE/COLONY COUNT: CPT

## 2022-06-29 PROCEDURE — 2580000003 HC RX 258: Performed by: PHYSICIAN ASSISTANT

## 2022-06-29 PROCEDURE — 6360000002 HC RX W HCPCS: Performed by: PHYSICIAN ASSISTANT

## 2022-06-29 PROCEDURE — 6360000004 HC RX CONTRAST MEDICATION: Performed by: PHYSICIAN ASSISTANT

## 2022-06-29 PROCEDURE — 84703 CHORIONIC GONADOTROPIN ASSAY: CPT

## 2022-06-29 PROCEDURE — 96374 THER/PROPH/DIAG INJ IV PUSH: CPT

## 2022-06-29 PROCEDURE — 81001 URINALYSIS AUTO W/SCOPE: CPT

## 2022-06-29 PROCEDURE — 96375 TX/PRO/DX INJ NEW DRUG ADDON: CPT

## 2022-06-29 PROCEDURE — 83690 ASSAY OF LIPASE: CPT

## 2022-06-29 RX ORDER — CEFUROXIME AXETIL 250 MG/1
250 TABLET ORAL 2 TIMES DAILY
Qty: 14 TABLET | Refills: 0 | Status: SHIPPED | OUTPATIENT
Start: 2022-06-29 | End: 2022-07-06

## 2022-06-29 RX ORDER — 0.9 % SODIUM CHLORIDE 0.9 %
1000 INTRAVENOUS SOLUTION INTRAVENOUS ONCE
Status: COMPLETED | OUTPATIENT
Start: 2022-06-29 | End: 2022-06-29

## 2022-06-29 RX ORDER — POTASSIUM CHLORIDE 20 MEQ/1
40 TABLET, EXTENDED RELEASE ORAL ONCE
Status: COMPLETED | OUTPATIENT
Start: 2022-06-29 | End: 2022-06-29

## 2022-06-29 RX ORDER — DIPHENHYDRAMINE HYDROCHLORIDE 50 MG/ML
12.5 INJECTION INTRAMUSCULAR; INTRAVENOUS ONCE
Status: COMPLETED | OUTPATIENT
Start: 2022-06-29 | End: 2022-06-29

## 2022-06-29 RX ORDER — KETOROLAC TROMETHAMINE 30 MG/ML
15 INJECTION, SOLUTION INTRAMUSCULAR; INTRAVENOUS ONCE
Status: COMPLETED | OUTPATIENT
Start: 2022-06-29 | End: 2022-06-29

## 2022-06-29 RX ORDER — METOCLOPRAMIDE HYDROCHLORIDE 5 MG/ML
5 INJECTION INTRAMUSCULAR; INTRAVENOUS ONCE
Status: COMPLETED | OUTPATIENT
Start: 2022-06-29 | End: 2022-06-29

## 2022-06-29 RX ADMIN — SODIUM CHLORIDE 1000 ML: 9 INJECTION, SOLUTION INTRAVENOUS at 20:56

## 2022-06-29 RX ADMIN — METOCLOPRAMIDE HYDROCHLORIDE 5 MG: 5 INJECTION INTRAMUSCULAR; INTRAVENOUS at 20:56

## 2022-06-29 RX ADMIN — IOPAMIDOL 75 ML: 755 INJECTION, SOLUTION INTRAVENOUS at 21:49

## 2022-06-29 RX ADMIN — POTASSIUM CHLORIDE 40 MEQ: 1500 TABLET, EXTENDED RELEASE ORAL at 22:53

## 2022-06-29 RX ADMIN — KETOROLAC TROMETHAMINE 15 MG: 30 INJECTION, SOLUTION INTRAMUSCULAR at 22:53

## 2022-06-29 RX ADMIN — DIPHENHYDRAMINE HYDROCHLORIDE 12.5 MG: 50 INJECTION, SOLUTION INTRAMUSCULAR; INTRAVENOUS at 20:57

## 2022-06-29 ASSESSMENT — ENCOUNTER SYMPTOMS
BACK PAIN: 0
VOMITING: 0
NAUSEA: 1
DIARRHEA: 1
ABDOMINAL PAIN: 1

## 2022-06-29 ASSESSMENT — PAIN DESCRIPTION - LOCATION
LOCATION: ABDOMEN

## 2022-06-29 ASSESSMENT — PAIN DESCRIPTION - FREQUENCY
FREQUENCY: CONTINUOUS

## 2022-06-29 ASSESSMENT — PAIN DESCRIPTION - ONSET
ONSET: GRADUAL

## 2022-06-29 ASSESSMENT — PAIN DESCRIPTION - ORIENTATION
ORIENTATION: RIGHT

## 2022-06-29 ASSESSMENT — PAIN SCALES - GENERAL
PAINLEVEL_OUTOF10: 9
PAINLEVEL_OUTOF10: 6
PAINLEVEL_OUTOF10: 6

## 2022-06-29 ASSESSMENT — PAIN DESCRIPTION - PAIN TYPE
TYPE: ACUTE PAIN

## 2022-06-29 ASSESSMENT — PAIN - FUNCTIONAL ASSESSMENT: PAIN_FUNCTIONAL_ASSESSMENT: 0-10

## 2022-06-29 ASSESSMENT — PAIN DESCRIPTION - DESCRIPTORS
DESCRIPTORS: ACHING;PRESSURE

## 2022-06-30 LAB
HEMATOLOGY PATH CONSULT: NORMAL
URINE CULTURE, ROUTINE: NORMAL

## 2022-06-30 ASSESSMENT — ENCOUNTER SYMPTOMS: COLOR CHANGE: 0

## 2022-06-30 NOTE — ED NOTES
Discharge and education instructions reviewed. Patient verbalized understanding, teach-back successful. Patient denied questions at this time. No acute distress noted. Patient instructed to follow-up as noted - return to emergency department if symptoms worsen. Patient verbalized understanding. Discharged per EDMD with discharged instructions.        Renetta Ellis RN  06/29/22 0288

## 2022-06-30 NOTE — ED PROVIDER NOTES
[facility]      Pt Name: Imelda Brewer  MRN: [id number]  [place] 2000  Date of evaluation: 6/29/2022  Provider: OLIVER Gonzalez    This patient was not seen and evaluated by the attending physician No att. providers found. CHIEF COMPLAINT       Chief Complaint   Patient presents with    Abdominal Pain     pt states right sided abd pain for 2 weeks, was seen yesterday also. CRITICAL CARE TIME   I performed a total Critical Care time of 15 minutes, excluding separately reportable procedures. There was a high probability of clinically significant/life threatening deterioration in the patient's condition which required my urgent intervention. Not limited to multiple reexaminations, discussions with attending physician and consultants. HISTORY OF PRESENT ILLNESS  (Location/Symptom, Timing/Onset, Context/Setting, Quality, Duration, Modifying Factors, Severity.)   Tracey Oro is a 25 y.o. female who presents to the emergency department with complaint of right-sided abdominal pain. Patient states this right-sided pain has been going on for the past 2 weeks. She states whenever she walks, talks, coughs, or taking short turns in the car the pain worsens. She states other than those instances, there are no other factors that worsen her abdominal pain. Patient also reports experiencing mild vaginal bleeding yesterday, chills, diarrhea, occasional nausea and dry heaving but no vomiting, congestion, and dry cough. She reports her last menstrual period was 2 weeks ago. She denies the pain worsening after eating, shortness of breath, chest pain, pain with urination and any radiating pain to the back or right shoulder. patient denies having any abdominal surgery. Denies any vaginal discharge or concern for STD. Nursing Notes were reviewed and I agree.     REVIEW OF SYSTEMS    (2-9 systems for level 4, 10 or more for level 5)     Review of Systems   Constitutional: Positive for chills. Negative for fever (patient unsure if has fever. ). Gastrointestinal: Positive for abdominal pain, diarrhea and nausea. Negative for vomiting. Genitourinary: Positive for vaginal bleeding. Negative for dysuria and vaginal discharge. Musculoskeletal: Negative for back pain. Skin: Negative for color change and wound. Neurological: Negative for light-headedness and headaches. Psychiatric/Behavioral: Negative for agitation, behavioral problems and confusion. Except as noted above the remainder of the review of systems was reviewed and negative. PAST MEDICAL HISTORY         Diagnosis Date    Bipolar 1 disorder ([facility] [address]     Depression        SURGICAL HISTORY     History reviewed. No pertinent surgical history. CURRENT MEDICATIONS       Discharge Medication List as of 2022 11:47 PM      CONTINUE these medications which have NOT CHANGED    Details   ARIPiprazole (ABILIFY) 10 MG tablet Take by mouth dailyHistorical Med      Prenatal 6.75-0.2 MG TABS Take 1 tablet by mouth daily, Disp-30 tablet, R-5Whichever is covered by insurance. Normal             ALLERGIES     Patient has no known allergies. FAMILY HISTORY           Problem Relation Age of Onset    High Blood Pressure Mother     Mental Illness Father     Seizures Maternal Grandmother     Hypertension Maternal Grandmother     High Blood Pressure Maternal Grandmother     Stroke Maternal Grandmother     High Blood Pressure Maternal Grandfather      Family Status   Relation Name Status    Mother  Alive    Father  Alive    Sister  Alive    Sister  Alive    Sister  Alive    Brother  Alive    MGM      MGF  Alive    PGM  Alive    PGF  Alive        SOCIAL HISTORY      reports that she has never smoked. She has never used smokeless tobacco. She reports previous drug use. Drug: Marijuana Ariela Alamo). She reports that she does not drink alcohol.     PHYSICAL to enteritis. 3.  No bowel obstruction. No intraperitoneal free air or abscess. LABS:  Labs Reviewed   CBC WITH AUTO DIFFERENTIAL - Abnormal; Notable for the following components:       Result Value    Hemoglobin 10.1 (*)     Hematocrit 31.0 (*)     MCV 68.1 (*)     MCH 22.2 (*)     RDW 20.2 (*)     Neutrophils Absolute 7.9 (*)     Anisocytosis 1+ (*)     Microcytes Occasional (*)     Polychromasia Occasional (*)     All other components within normal limits   COMPREHENSIVE METABOLIC PANEL - Abnormal; Notable for the following components:    Potassium 3.2 (*)     Albumin/Globulin Ratio 1.0 (*)     ALT 9 (*)     All other components within normal limits   URINALYSIS WITH REFLEX TO CULTURE - Abnormal; Notable for the following components:    Color, UA DARK YELLOW (*)     Clarity, UA CLOUDY (*)     Bilirubin Urine MODERATE (*)     Ketones, Urine 80 (*)     Protein, UA 30 (*)     Urobilinogen, Urine 2.0 (*)     Leukocyte Esterase, Urine MODERATE (*)     All other components within normal limits   MICROSCOPIC URINALYSIS - Abnormal; Notable for the following components:    Bacteria, UA 2+ (*)     WBC, UA 16 (*)     Epithelial Cells, UA 16 (*)     All other components within normal limits   CULTURE, URINE   LIPASE   HCG, SERUM, QUALITATIVE       All other labs were within normal range or not returned as of this dictation. EMERGENCY DEPARTMENT COURSE and DIFFERENTIAL DIAGNOSIS/MDM:   Vitals:    Vitals:    06/29/22 2018 06/29/22 2230 06/29/22 2315   BP: 111/65 110/78 115/78   Pulse: (!) 105 89 80   Resp: 16 16 16   Temp: 99.5 °F (37.5 °C)     TempSrc: Oral     SpO2: 98%     Weight: 250 lb 7.1 oz (113.6 kg)     Height: 5' 9\" (1.753 m)         Patient is an afebrile nontachycardic female who presents with right-sided abdominal pain. The abdominal pain has been going on for the past 2 weeks, and has gotten mildly worse over time. She was seen in the emergency department yesterday.  Labs and imaging were ordered. Patient is mildly anemic, but when looking at her previous hemoglobin levels, she tends to trend around 10. On comprehensive metabolic panel, the potassium was mildly low at 3.2, but ALT, AST, and alkaline phosphatase were all within normal limits showing no liver damage. Lipase was within normal limit lowering the suspicion for pancreatitis. Pregnancy test was negative, lowering the suspicion for ectopic pregnancy. Urinalysis did show her urine was cloudy, moderate bilirubin, and moderate amount of leukocyte esterase, which elevates the suspicion for a urinary tract infection. Patient's urine culture is still pending, but patient was treated empirically with cefuroxime. Patient is advised to take the antibiotic as prescribed and to completely finish the course of antibiotics. Patient was advised to return to the emergency department if her condition or symptoms worsen. MDM: See HPI and physical exam for detailed patient encounter. Differential diagnosis includes: Appendicitis, cholecystitis, pancreatitis, kidney stone, pyelonephritis, UTI, ectopic pregnancy, ovarian cyst, constipation colonic spasm,    CONSULTS:  None    PROCEDURES:  Procedures      FINAL IMPRESSION      1. Right flank pain    2. Urinary tract infection without hematuria, site unspecified    3.  Hypokalemia          DISPOSITION/PLAN   DISPOSITION        PATIENT REFERRED TO:  Charis Feldman, [address] [facility] [address] [facility] [place] [address]  [phone]    Call in 1 day  For follow up with gynecology      DISCHARGE MEDICATIONS:  Discharge Medication List as of 6/29/2022 11:47 PM      START taking these medications    Details   cefUROXime (CEFTIN) 250 MG tablet Take 1 tablet by mouth 2 times daily for 7 days, Disp-14 tablet, R-0Print             (Please note that portions of this note were completed with a voice recognition program.  Efforts were made to edit the dictations but occasionally words are mis-transcribed.)    [facility], OLIVER Ruelas [facility], [address]  06/30/22 9022

## 2024-09-12 NOTE — FLOWSHEET NOTE
Medication changes today:?   - Continue Mounjaro 12.5mg weekly to finish up at least 4 wks / 1 month on this dose then can increase to 15mg max weekly dose if you would like to see if this helps promote further weight loss      Dr. Georgie Jones - Gastroenterologist   Pt sitting up for epidural. Clemencia Cain CRNA at bedside. TO performed.